# Patient Record
Sex: MALE | URBAN - METROPOLITAN AREA
[De-identification: names, ages, dates, MRNs, and addresses within clinical notes are randomized per-mention and may not be internally consistent; named-entity substitution may affect disease eponyms.]

---
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
## 2025-03-26 ENCOUNTER — OFFICE VISIT (OUTPATIENT)
Dept: URGENT CARE | Facility: URGENT CARE | Age: 20
End: 2025-03-26
Payer: COMMERCIAL

## 2025-03-26 VITALS
HEART RATE: 78 BPM | RESPIRATION RATE: 20 BRPM | OXYGEN SATURATION: 98 % | TEMPERATURE: 98 F | DIASTOLIC BLOOD PRESSURE: 78 MMHG | SYSTOLIC BLOOD PRESSURE: 120 MMHG

## 2025-03-26 DIAGNOSIS — Z11.3 SCREEN FOR STD (SEXUALLY TRANSMITTED DISEASE): Primary | ICD-10-CM

## 2025-03-26 PROCEDURE — 87491 CHLMYD TRACH DNA AMP PROBE: CPT | Performed by: PHYSICIAN ASSISTANT

## 2025-03-26 PROCEDURE — 3074F SYST BP LT 130 MM HG: CPT | Performed by: PHYSICIAN ASSISTANT

## 2025-03-26 PROCEDURE — 87591 N.GONORRHOEAE DNA AMP PROB: CPT | Performed by: PHYSICIAN ASSISTANT

## 2025-03-26 PROCEDURE — 99213 OFFICE O/P EST LOW 20 MIN: CPT | Performed by: PHYSICIAN ASSISTANT

## 2025-03-26 PROCEDURE — 3078F DIAST BP <80 MM HG: CPT | Performed by: PHYSICIAN ASSISTANT

## 2025-03-26 NOTE — PROGRESS NOTES
[facility] [address] [address]  Phone: [phone]  Fax: [phone]    Patient:  Janelle Harmon, Date of birth 2005  Date of Visit:  03/26/2025  Referring Provider No ref. provider found    Patient presents with:  STD: Std check           ICD-10-CM    1. Screen for STD (sexually transmitted disease)  Z11.3 Neisseria gonorrhoeae PCR     Chlamydia trachomatis PCR     Neisseria gonorrhoeae PCR     Chlamydia trachomatis PCR     Treponema Abs w Reflex to RPR and Titer     CANCELED: Trichomonas vaginalis by PCR     CANCELED: HIV Antigen Antibody Combo Cascade     CANCELED: Hepatitis C antibody          Patient Instructions   Your STD screening test results will be back in about 24-48 hours.     Assessment & Plan      Assessment  - No explicit diagnosis or assessment was made during the encounter. The patient requested testing for gonorrhea and chlamydia.    Plan  - Order tests for gonorrhea and chlamydia.  - Results will be available in about 24 hours via "[employer]  - Call the patient if any results are positive.         History of Present Illness     Pertinent history obtain from: patient    Janelle Harmon, a 20-year-old male, presented for routine STD screening at urgent care. He denied any symptoms or known exposures to sexually transmitted diseases. He declines serum testing for HIV, Syphilis, and Hepatitis.     Problem List:  There are no relevant problems documented for this patient.      No past medical history on file.    Social History     Tobacco Use    Smoking status: Never    Smokeless tobacco: Never   Substance Use Topics    Alcohol use: Not on file       Physical Exam     Physical Exam  Vitals and nursing note reviewed.   Constitutional:       General: He is not in acute distress.     Appearance: He is not toxic-appearing or diaphoretic.   HENT:      Head: Normocephalic and atraumatic.   Eyes:      Conjunctiva/sclera:  Conjunctivae normal.   Pulmonary:      Effort: Pulmonary effort is normal.   Neurological:      Mental Status: He is alert.   Psychiatric:         Mood and Affect: Mood normal.         Behavior: Behavior normal.         Thought Content: Thought content normal.         Judgment: Judgment normal.         Vital signs:  /78   Pulse 78   Temp 98  F (36.7  C)   Resp 20   SpO2 98%              Consent was obtained from the patient to use an AI documentation tool in the creation of  this note

## 2025-03-27 LAB
C TRACH DNA SPEC QL NAA+PROBE: NEGATIVE
N GONORRHOEA DNA SPEC QL NAA+PROBE: NEGATIVE
SPECIMEN TYPE: NORMAL
SPECIMEN TYPE: NORMAL

## 2025-03-30 ENCOUNTER — HEALTH MAINTENANCE LETTER (OUTPATIENT)
Age: 20
End: 2025-03-30